# Patient Record
(demographics unavailable — no encounter records)

---

## 2025-01-13 NOTE — PHYSICAL EXAM
[No Acute Distress] : no acute distress [EOMI] : extraocular movements intact [Normal Outer Ear/Nose] : the outer ears and nose were normal in appearance [No JVD] : no jugular venous distention [No Respiratory Distress] : no respiratory distress  [No Accessory Muscle Use] : no accessory muscle use [Clear to Auscultation] : lungs were clear to auscultation bilaterally [Normal Rate] : normal rate  [Regular Rhythm] : with a regular rhythm [Normal S1, S2] : normal S1 and S2 [No Carotid Bruits] : no carotid bruits [No Edema] : there was no peripheral edema [Non-distended] : non-distended [No CVA Tenderness] : no CVA  tenderness [Coordination Grossly Intact] : coordination grossly intact [No Focal Deficits] : no focal deficits [Normal Gait] : normal gait [Normal Affect] : the affect was normal [Normal Mood] : the mood was normal [Normal Insight/Judgement] : insight and judgment were intact [de-identified] : C-spine NT to palpation  [de-identified] : +ve R pcarcervical hypertonicity

## 2025-01-13 NOTE — HISTORY OF PRESENT ILLNESS
[FreeTextEntry8] : pt c/o neck pain   as above, currently engaged in PT.  PT is helping  pain located on R side of neck c radiation up into suboccipital region resulting in H/A  Denies numbness/paresthesia in RUE  mild disruption of sleep

## 2025-03-20 NOTE — HEALTH RISK ASSESSMENT
[Patient declined colonoscopy] : Patient declined colonoscopy [ColonoscopyDate] : 03/25 [ColonoscopyComments] : despite strong advisement

## 2025-03-20 NOTE — PHYSICAL EXAM
[No Acute Distress] : no acute distress [EOMI] : extraocular movements intact [Normal Outer Ear/Nose] : the outer ears and nose were normal in appearance [No JVD] : no jugular venous distention [No Respiratory Distress] : no respiratory distress  [No Accessory Muscle Use] : no accessory muscle use [Clear to Auscultation] : lungs were clear to auscultation bilaterally [Normal Rate] : normal rate  [Regular Rhythm] : with a regular rhythm [Normal S1, S2] : normal S1 and S2 [No Carotid Bruits] : no carotid bruits [No Edema] : there was no peripheral edema [Non-distended] : non-distended [No CVA Tenderness] : no CVA  tenderness [Coordination Grossly Intact] : coordination grossly intact [No Focal Deficits] : no focal deficits [Normal Gait] : normal gait [Normal Affect] : the affect was normal [Normal Mood] : the mood was normal [Normal Insight/Judgement] : insight and judgment were intact [de-identified] : C-spine NT to palpation  [de-identified] : +ve R pcarcervical hypertonicity

## 2025-03-20 NOTE — HISTORY OF PRESENT ILLNESS
[FreeTextEntry1] : CPE [de-identified] : CPE    as above,   +ve FAST no CP/SOB c activity no dizziness no palpitations  no syncope   no N/V/D +ve BM qd-bid no bloody/black stools  no urinary complaints   pt reports R sided "tingling sensation" in neck  .  pt states symptoms started after he stopped PT 2-3 weeks ago +ve occasional RUE paresthesias  Denies H/A  Denies visual changes   Denies  change in chronic tinnitus

## 2025-03-20 NOTE — PLAN
[FreeTextEntry1] : EKG Performed: SB at 45 bpm, normal axis, no ST/T changes  see referral x 2   see lab orders    f/u 6 weeks for reeval of neck pain

## 2025-04-03 NOTE — CARDIOLOGY SUMMARY
[de-identified] : 3/20/2025, SB [de-identified] : 2/22/2018, ETT: no ischemia [de-identified] : 2/22/2018, LV EF 60% no significant valvular disease [de-identified] : 10/11/2022, CT Heart for Calcium Scorin [de-identified] : 2/22/2018, Carotid Duplex: minimal disease

## 2025-04-03 NOTE — HISTORY OF PRESENT ILLNESS
[FreeTextEntry1] : 58 year old male with PMHx of CAD (CT Heart for Calcium Scoring-53, 10/2022), HLD, presents for a cardiac evaluation for sinus bradycardia. Patient had ECG done with his PCP, 3/20/2025, which demonstrated SB at a rate of 45bpm. Patient is active with no chest pain, dyspnea or palpitations. There is no fatigue, lightheadedness or syncope.  There is no history of MI, CVA, CHF, or previous coronary intervention.

## 2025-04-03 NOTE — DISCUSSION/SUMMARY
[FreeTextEntry1] : 1. Sinus Bradycardia: reviewed labs from March 2025. Normal electrolytes, normal TSH. Patient is asymptomatic, so no further testing in this regard is necessary.  2. CAD: reviewed CT Heart for Calcium Scoring from 10/2022. It was elevated at 53. Patient's LDL was over 100 on labs from March 2025. His goal should be less than 70. I discussed the benefits of statin therapy. Patient would like to have another CT Heart for Calcium Scoring done before deciding.  3. HLD: Patient's LDL was over 100 on labs from March 2025. His goal should be less than 70. I discussed the benefits of statin therapy. Patient would like to have another CT Heart for Calcium Scoring done before deciding.  Office will call with results.

## 2025-04-03 NOTE — PHYSICAL EXAM
[Normal] : moves all extremities, no focal deficits, normal speech [de-identified] :  No carotid bruits auscultated bilaterally.